# Patient Record
Sex: FEMALE | Race: BLACK OR AFRICAN AMERICAN | NOT HISPANIC OR LATINO | Employment: UNEMPLOYED | URBAN - METROPOLITAN AREA
[De-identification: names, ages, dates, MRNs, and addresses within clinical notes are randomized per-mention and may not be internally consistent; named-entity substitution may affect disease eponyms.]

---

## 2021-01-21 ENCOUNTER — HOSPITAL ENCOUNTER (EMERGENCY)
Facility: HOSPITAL | Age: 41
Discharge: HOME | End: 2021-01-21
Attending: EMERGENCY MEDICINE

## 2021-01-21 VITALS
TEMPERATURE: 99 F | RESPIRATION RATE: 20 BRPM | WEIGHT: 263.13 LBS | SYSTOLIC BLOOD PRESSURE: 186 MMHG | BODY MASS INDEX: 41.3 KG/M2 | HEART RATE: 81 BPM | DIASTOLIC BLOOD PRESSURE: 100 MMHG | HEIGHT: 67 IN | OXYGEN SATURATION: 100 %

## 2021-01-21 DIAGNOSIS — I10 ESSENTIAL HYPERTENSION: Primary | ICD-10-CM

## 2021-01-21 DIAGNOSIS — D64.9 ANEMIA, UNSPECIFIED TYPE: ICD-10-CM

## 2021-01-21 DIAGNOSIS — N92.0 MENORRHAGIA WITH REGULAR CYCLE: ICD-10-CM

## 2021-01-21 DIAGNOSIS — R55 NEAR SYNCOPE: ICD-10-CM

## 2021-01-21 LAB
ALBUMIN SERPL BCP-MCNC: 4 G/DL (ref 3.5–5.2)
ALP SERPL-CCNC: 57 U/L (ref 55–135)
ALT SERPL W/O P-5'-P-CCNC: 14 U/L (ref 10–44)
ANION GAP SERPL CALC-SCNC: 9 MMOL/L (ref 8–16)
AST SERPL-CCNC: 20 U/L (ref 10–40)
B-HCG UR QL: NEGATIVE
BASOPHILS # BLD AUTO: 0.04 K/UL (ref 0–0.2)
BASOPHILS NFR BLD: 0.5 % (ref 0–1.9)
BILIRUB SERPL-MCNC: 0.3 MG/DL (ref 0.1–1)
BUN SERPL-MCNC: 19 MG/DL (ref 6–20)
CALCIUM SERPL-MCNC: 8.4 MG/DL (ref 8.7–10.5)
CHLORIDE SERPL-SCNC: 102 MMOL/L (ref 95–110)
CO2 SERPL-SCNC: 27 MMOL/L (ref 23–29)
CREAT SERPL-MCNC: 1.1 MG/DL (ref 0.5–1.4)
DIFFERENTIAL METHOD: ABNORMAL
EOSINOPHIL # BLD AUTO: 0 K/UL (ref 0–0.5)
EOSINOPHIL NFR BLD: 0.4 % (ref 0–8)
ERYTHROCYTE [DISTWIDTH] IN BLOOD BY AUTOMATED COUNT: 19.5 % (ref 11.5–14.5)
EST. GFR  (AFRICAN AMERICAN): >60 ML/MIN/1.73 M^2
EST. GFR  (NON AFRICAN AMERICAN): >60 ML/MIN/1.73 M^2
GLUCOSE SERPL-MCNC: 107 MG/DL (ref 70–110)
HCT VFR BLD AUTO: 28.1 % (ref 37–48.5)
HCV AB SERPL QL IA: NEGATIVE
HGB BLD-MCNC: 7.3 G/DL (ref 12–16)
HIV 1+2 AB+HIV1 P24 AG SERPL QL IA: NEGATIVE
IMM GRANULOCYTES # BLD AUTO: 0.03 K/UL (ref 0–0.04)
IMM GRANULOCYTES NFR BLD AUTO: 0.4 % (ref 0–0.5)
LYMPHOCYTES # BLD AUTO: 1.5 K/UL (ref 1–4.8)
LYMPHOCYTES NFR BLD: 19.6 % (ref 18–48)
MCH RBC QN AUTO: 17.3 PG (ref 27–31)
MCHC RBC AUTO-ENTMCNC: 26 G/DL (ref 32–36)
MCV RBC AUTO: 67 FL (ref 82–98)
MONOCYTES # BLD AUTO: 0.5 K/UL (ref 0.3–1)
MONOCYTES NFR BLD: 6.2 % (ref 4–15)
NEUTROPHILS # BLD AUTO: 5.6 K/UL (ref 1.8–7.7)
NEUTROPHILS NFR BLD: 72.9 % (ref 38–73)
NRBC BLD-RTO: 0 /100 WBC
PLATELET # BLD AUTO: 313 K/UL (ref 150–350)
PMV BLD AUTO: 10.3 FL (ref 9.2–12.9)
POTASSIUM SERPL-SCNC: 4.2 MMOL/L (ref 3.5–5.1)
PROT SERPL-MCNC: 7.9 G/DL (ref 6–8.4)
RBC # BLD AUTO: 4.22 M/UL (ref 4–5.4)
SODIUM SERPL-SCNC: 138 MMOL/L (ref 136–145)
WBC # BLD AUTO: 7.74 K/UL (ref 3.9–12.7)

## 2021-01-21 PROCEDURE — 93010 ELECTROCARDIOGRAM REPORT: CPT | Mod: ,,, | Performed by: INTERNAL MEDICINE

## 2021-01-21 PROCEDURE — 86703 HIV-1/HIV-2 1 RESULT ANTBDY: CPT

## 2021-01-21 PROCEDURE — 93005 ELECTROCARDIOGRAM TRACING: CPT

## 2021-01-21 PROCEDURE — 86803 HEPATITIS C AB TEST: CPT

## 2021-01-21 PROCEDURE — 36415 COLL VENOUS BLD VENIPUNCTURE: CPT

## 2021-01-21 PROCEDURE — 81025 URINE PREGNANCY TEST: CPT

## 2021-01-21 PROCEDURE — 96360 HYDRATION IV INFUSION INIT: CPT

## 2021-01-21 PROCEDURE — 80053 COMPREHEN METABOLIC PANEL: CPT

## 2021-01-21 PROCEDURE — 93010 EKG 12-LEAD: ICD-10-PCS | Mod: ,,, | Performed by: INTERNAL MEDICINE

## 2021-01-21 PROCEDURE — 25000003 PHARM REV CODE 250: Performed by: EMERGENCY MEDICINE

## 2021-01-21 PROCEDURE — 99284 EMERGENCY DEPT VISIT MOD MDM: CPT | Mod: 25

## 2021-01-21 PROCEDURE — 85025 COMPLETE CBC W/AUTO DIFF WBC: CPT

## 2021-01-21 RX ORDER — CLONIDINE HYDROCHLORIDE 0.1 MG/1
0.1 TABLET ORAL
Status: DISCONTINUED | OUTPATIENT
Start: 2021-01-21 | End: 2021-01-21

## 2021-01-21 RX ORDER — HYDROCHLOROTHIAZIDE 25 MG/1
25 TABLET ORAL DAILY
Qty: 30 TABLET | Refills: 0 | Status: SHIPPED | OUTPATIENT
Start: 2021-01-21 | End: 2021-02-20

## 2021-01-21 RX ORDER — CLONIDINE HYDROCHLORIDE 0.2 MG/1
0.2 TABLET ORAL
Status: COMPLETED | OUTPATIENT
Start: 2021-01-21 | End: 2021-01-21

## 2021-01-21 RX ORDER — HYDROCHLOROTHIAZIDE 25 MG/1
25 TABLET ORAL ONCE
Status: COMPLETED | OUTPATIENT
Start: 2021-01-21 | End: 2021-01-21

## 2021-01-21 RX ORDER — HYDROCHLOROTHIAZIDE 25 MG/1
25 TABLET ORAL DAILY
Status: DISCONTINUED | OUTPATIENT
Start: 2021-01-22 | End: 2021-01-21

## 2021-01-21 RX ADMIN — SODIUM CHLORIDE 500 ML: 0.9 INJECTION, SOLUTION INTRAVENOUS at 07:01

## 2021-01-21 RX ADMIN — HYDROCHLOROTHIAZIDE 25 MG: 25 TABLET ORAL at 07:01

## 2021-01-21 RX ADMIN — CLONIDINE HYDROCHLORIDE 0.2 MG: 0.2 TABLET ORAL at 07:01

## 2025-04-05 ENCOUNTER — HOSPITAL ENCOUNTER (EMERGENCY)
Facility: HOSPITAL | Age: 45
Discharge: HOME | End: 2025-04-05
Attending: FAMILY MEDICINE

## 2025-04-05 VITALS
DIASTOLIC BLOOD PRESSURE: 95 MMHG | SYSTOLIC BLOOD PRESSURE: 185 MMHG | HEIGHT: 67 IN | BODY MASS INDEX: 41.21 KG/M2 | OXYGEN SATURATION: 98 % | TEMPERATURE: 99 F | RESPIRATION RATE: 20 BRPM | HEART RATE: 88 BPM

## 2025-04-05 DIAGNOSIS — R55 NEAR SYNCOPE: ICD-10-CM

## 2025-04-05 DIAGNOSIS — Z51.89 ENCOUNTER FOR BLOOD TRANSFUSION: ICD-10-CM

## 2025-04-05 DIAGNOSIS — N92.4 EXCESSIVE BLEEDING IN PREMENOPAUSAL PERIOD: ICD-10-CM

## 2025-04-05 DIAGNOSIS — D64.9 ANEMIA, UNSPECIFIED TYPE: ICD-10-CM

## 2025-04-05 DIAGNOSIS — T80.92XA BLOOD TRANSFUSION REACTION, INITIAL ENCOUNTER: Primary | ICD-10-CM

## 2025-04-05 LAB
ABSOLUTE EOSINOPHIL (OHS): 0.03 K/UL
ABSOLUTE MONOCYTE (OHS): 0.51 K/UL (ref 0.3–1)
ABSOLUTE NEUTROPHIL COUNT (OHS): 6.08 K/UL (ref 1.8–7.7)
ALBUMIN SERPL BCP-MCNC: 3.4 G/DL (ref 3.5–5.2)
ALP SERPL-CCNC: 65 UNIT/L (ref 40–150)
ALT SERPL W/O P-5'-P-CCNC: 6 UNIT/L (ref 10–44)
ANION GAP (OHS): 10 MMOL/L (ref 8–16)
AST SERPL-CCNC: 13 UNIT/L (ref 11–45)
B-HCG UR QL: NEGATIVE
BACTERIA #/AREA URNS AUTO: NORMAL /HPF
BASOPHILS # BLD AUTO: 0.02 K/UL
BASOPHILS NFR BLD AUTO: 0.3 %
BILIRUB SERPL-MCNC: 0.3 MG/DL (ref 0.1–1)
BILIRUB UR QL STRIP.AUTO: NEGATIVE
BNP SERPL-MCNC: 69 PG/ML (ref 0–99)
BUN SERPL-MCNC: 14 MG/DL (ref 6–20)
CALCIUM SERPL-MCNC: 9 MG/DL (ref 8.7–10.5)
CHLORIDE SERPL-SCNC: 102 MMOL/L (ref 95–110)
CLARITY UR: ABNORMAL
CO2 SERPL-SCNC: 24 MMOL/L (ref 23–29)
COLOR UR AUTO: YELLOW
CREAT SERPL-MCNC: 1.3 MG/DL (ref 0.5–1.4)
ERYTHROCYTE [DISTWIDTH] IN BLOOD BY AUTOMATED COUNT: 19.1 % (ref 11.5–14.5)
GFR SERPLBLD CREATININE-BSD FMLA CKD-EPI: 52 ML/MIN/1.73/M2
GLUCOSE SERPL-MCNC: 101 MG/DL (ref 70–110)
GLUCOSE UR QL STRIP: NEGATIVE
HCT VFR BLD AUTO: 20.6 % (ref 37–48.5)
HCV AB SERPL QL IA: NEGATIVE
HGB BLD-MCNC: 5.3 GM/DL (ref 12–16)
HGB UR QL STRIP: ABNORMAL
HIV 1+2 AB+HIV1 P24 AG SERPL QL IA: NEGATIVE
IMM GRANULOCYTES # BLD AUTO: 0.04 K/UL (ref 0–0.04)
IMM GRANULOCYTES NFR BLD AUTO: 0.5 % (ref 0–0.5)
INDIRECT COOMBS: NORMAL
KETONES UR QL STRIP: ABNORMAL
LEUKOCYTE ESTERASE UR QL STRIP: NEGATIVE
LYMPHOCYTES # BLD AUTO: 1.27 K/UL (ref 1–4.8)
MCH RBC QN AUTO: 16.3 PG (ref 27–31)
MCHC RBC AUTO-ENTMCNC: 25.7 G/DL (ref 32–36)
MCV RBC AUTO: 63 FL (ref 82–98)
MICROSCOPIC COMMENT: NORMAL
NITRITE UR QL STRIP: NEGATIVE
NUCLEATED RBC (/100WBC) (OHS): 0 /100 WBC
PH UR STRIP: 6 [PH]
PLATELET # BLD AUTO: 540 K/UL (ref 150–450)
PMV BLD AUTO: 9.5 FL (ref 9.2–12.9)
POTASSIUM SERPL-SCNC: 4 MMOL/L (ref 3.5–5.1)
PROT SERPL-MCNC: 8.7 GM/DL (ref 6–8.4)
PROT UR QL STRIP: ABNORMAL
RBC # BLD AUTO: 3.25 M/UL (ref 4–5.4)
RBC #/AREA URNS AUTO: 2 /HPF (ref 0–4)
RELATIVE EOSINOPHIL (OHS): 0.4 %
RELATIVE LYMPHOCYTE (OHS): 16 % (ref 18–48)
RELATIVE MONOCYTE (OHS): 6.4 % (ref 4–15)
RELATIVE NEUTROPHIL (OHS): 76.4 % (ref 38–73)
RH BLD: NORMAL
SODIUM SERPL-SCNC: 136 MMOL/L (ref 136–145)
SP GR UR STRIP: 1.01
SPECIMEN OUTDATE: NORMAL
SQUAMOUS #/AREA URNS AUTO: 7 /HPF
TROPONIN I SERPL DL<=0.01 NG/ML-MCNC: 0.01 NG/ML
UROBILINOGEN UR STRIP-ACNC: NEGATIVE EU/DL
WBC # BLD AUTO: 7.95 K/UL (ref 3.9–12.7)
WBC #/AREA URNS AUTO: 3 /HPF (ref 0–5)

## 2025-04-05 PROCEDURE — 63600175 PHARM REV CODE 636 W HCPCS: Mod: JZ,TB | Performed by: FAMILY MEDICINE

## 2025-04-05 PROCEDURE — 99285 EMERGENCY DEPT VISIT HI MDM: CPT | Mod: 25

## 2025-04-05 PROCEDURE — 96374 THER/PROPH/DIAG INJ IV PUSH: CPT

## 2025-04-05 PROCEDURE — 86850 RBC ANTIBODY SCREEN: CPT | Performed by: FAMILY MEDICINE

## 2025-04-05 PROCEDURE — 85025 COMPLETE CBC W/AUTO DIFF WBC: CPT | Performed by: FAMILY MEDICINE

## 2025-04-05 PROCEDURE — 87389 HIV-1 AG W/HIV-1&-2 AB AG IA: CPT | Performed by: FAMILY MEDICINE

## 2025-04-05 PROCEDURE — 84484 ASSAY OF TROPONIN QUANT: CPT | Performed by: FAMILY MEDICINE

## 2025-04-05 PROCEDURE — 36430 TRANSFUSION BLD/BLD COMPNT: CPT

## 2025-04-05 PROCEDURE — 81025 URINE PREGNANCY TEST: CPT | Performed by: FAMILY MEDICINE

## 2025-04-05 PROCEDURE — 80053 COMPREHEN METABOLIC PANEL: CPT | Performed by: FAMILY MEDICINE

## 2025-04-05 PROCEDURE — 83880 ASSAY OF NATRIURETIC PEPTIDE: CPT | Performed by: FAMILY MEDICINE

## 2025-04-05 PROCEDURE — P9016 RBC LEUKOCYTES REDUCED: HCPCS | Performed by: FAMILY MEDICINE

## 2025-04-05 PROCEDURE — 86803 HEPATITIS C AB TEST: CPT | Performed by: FAMILY MEDICINE

## 2025-04-05 PROCEDURE — 25000003 PHARM REV CODE 250: Performed by: FAMILY MEDICINE

## 2025-04-05 PROCEDURE — 86920 COMPATIBILITY TEST SPIN: CPT | Performed by: FAMILY MEDICINE

## 2025-04-05 PROCEDURE — 81001 URINALYSIS AUTO W/SCOPE: CPT | Performed by: FAMILY MEDICINE

## 2025-04-05 RX ORDER — HYDROCODONE BITARTRATE AND ACETAMINOPHEN 500; 5 MG/1; MG/1
TABLET ORAL
Status: DISCONTINUED | OUTPATIENT
Start: 2025-04-05 | End: 2025-04-05 | Stop reason: HOSPADM

## 2025-04-05 RX ORDER — METHYLPREDNISOLONE SOD SUCC 125 MG
125 VIAL (EA) INJECTION
Status: COMPLETED | OUTPATIENT
Start: 2025-04-05 | End: 2025-04-05

## 2025-04-05 RX ORDER — CLONIDINE HYDROCHLORIDE 0.2 MG/1
0.2 TABLET ORAL
Status: COMPLETED | OUTPATIENT
Start: 2025-04-05 | End: 2025-04-05

## 2025-04-05 RX ADMIN — CLONIDINE HYDROCHLORIDE 0.2 MG: 0.2 TABLET ORAL at 05:04

## 2025-04-05 RX ADMIN — METHYLPREDNISOLONE SODIUM SUCCINATE 125 MG: 125 INJECTION, POWDER, FOR SOLUTION INTRAMUSCULAR; INTRAVENOUS at 11:04

## 2025-04-05 NOTE — ED NOTES
Provider notified that the pt's BP and Temp have elevated and that the pt does not have any s/s at this time. Decision made to consider a reaction. Reaction protocol started blood bank notified.

## 2025-04-05 NOTE — ED PROVIDER NOTES
SCRIBE #1 NOTE: I, Carmen Chavez, am scribing for, and in the presence of, Cecille Crawford MD. I have scribed the entire note.       History     Chief Complaint   Patient presents with    near syncopal episode     Pt was at softball game and bent down to  water bottle then felt like she was going to pass out. Pt also reports sinus infection since last week. Pt has no complaints on arrival and is gcs 15     Review of patient's allergies indicates:  No Known Allergies      History of Present Illness     HPI    4/5/2025, 10:11 AM  History obtained from the patient and medical records      History of Present Illness: Marcelina Colon is a 45 y.o. female patient with a PMHx of thyroid adenoma and HTN who presents to the Emergency Department for evaluation of near syncope which onset PTA. Pt states that while at her daughter's track meet this morning, she dropped her water bottle. When pt leaned over to  her water bottle, she became dizzy and felt like she was about to pass out. Pt also reports having sinus issues for the past week including HA, rhinorrhea, congestion, and eye watering. Pt reports a thick yellow discharge from her nose. Pt states that she is feeling better while in the ED. No mitigating or exacerbating factors reported. Associated sxs include dizziness, HA, rhinorrhea, congestion, and eye watering. Patient denies any sinus pressure, CP, or SOB. Prior Tx includes OTC sinus medication last week.  No further complaints or concerns at this time.       Arrival mode: Ambulance Service    PCP: No, Primary Doctor        Past Medical History:  Past Medical History:   Diagnosis Date    Thyroid adenoma        Past Surgical History:  Past Surgical History:   Procedure Laterality Date    THYROID SURGERY           Family History:  No family history on file.    Social History:  Social History     Tobacco Use    Smoking status: Every Day     Types: Cigarettes    Smokeless tobacco: Never   Substance  and Sexual Activity    Alcohol use: Yes    Drug use: Never    Sexual activity: Yes        Review of Systems     Review of Systems   Constitutional:  Negative for fever.   HENT:  Positive for congestion and rhinorrhea. Negative for sinus pressure and sore throat.    Eyes:  Positive for discharge.   Respiratory:  Negative for shortness of breath.    Cardiovascular:  Negative for chest pain.   Gastrointestinal:  Negative for nausea.   Genitourinary:  Negative for dysuria.   Musculoskeletal:  Negative for back pain.   Skin:  Negative for rash.   Neurological:  Positive for dizziness and headaches. Negative for weakness.        (+) near syncope   Hematological:  Does not bruise/bleed easily.      Physical Exam     Initial Vitals [04/05/25 0955]   BP Pulse Resp Temp SpO2   132/84 (!) 113 18 98.7 °F (37.1 °C) 99 %      MAP       --          Physical Exam  Nursing Notes and Vital Signs Reviewed.  Constitutional: Patient is in no acute distress. Well-developed and well-nourished.  Head: Atraumatic. Normocephalic.  Eyes: PERRL. EOM intact. Conjunctivae are not pale. No scleral icterus.  ENT: Mucous membranes are moist. Oropharynx is clear and symmetric. There is sinus drainage.  Neck: Supple. Full ROM. No lymphadenopathy.  Cardiovascular: Regular rate. Regular rhythm. No murmurs, rubs, or gallops. Distal pulses are 2+ and symmetric.  Pulmonary/Chest: No respiratory distress. Clear to auscultation bilaterally. No wheezing or rales.  Abdominal: Soft and non-distended.  There is no tenderness.  No rebound, guarding, or rigidity. Good bowel sounds.  Genitourinary: No CVA tenderness.  Musculoskeletal: Moves all extremities. No obvious deformities. No edema. No calf tenderness.  Skin: Warm and dry.  Neurological:  Alert, awake, and appropriate.  Normal speech.  No acute focal neurological deficits are appreciated.  Psychiatric: Normal affect. Good eye contact. Appropriate in content.     ED Course   Critical Care    Date/Time:  4/5/2025 2:49 PM    Performed by: Cecille Crawford MD  Authorized by: Cecille Crafword MD  Direct patient critical care time: 15 minutes  Additional history critical care time: 12 minutes  Ordering / reviewing critical care time: 10 minutes  Documentation critical care time: 8 minutes  Total critical care time (exclusive of procedural time) : 45 minutes  Critical care time was exclusive of separately billable procedures and treating other patients and teaching time.  Critical care was necessary to treat or prevent imminent or life-threatening deterioration of the following conditions: symptomatic anemia requiring blood transfusion.  Critical care was time spent personally by me on the following activities: blood draw for specimens, development of treatment plan with patient or surrogate, discussions with consultants, interpretation of cardiac output measurements, evaluation of patient's response to treatment, examination of patient, obtaining history from patient or surrogate, ordering and performing treatments and interventions, ordering and review of laboratory studies, ordering and review of radiographic studies, pulse oximetry, re-evaluation of patient's condition and review of old charts.        ED Vital Signs:  Vitals:    04/05/25 1430 04/05/25 1445 04/05/25 1500 04/05/25 1530   BP: (!) 180/101 (!) 171/93 (!) 176/96 (!) 183/90   Pulse: 100 96 95 94   Resp: 18 16 18 (!) 21   Temp:   98.8 °F (37.1 °C)    TempSrc:   Oral    SpO2: 100% 99% 100% 100%   Height:        04/05/25 1600 04/05/25 1630 04/05/25 1721 04/05/25 1730   BP: (!) 181/100 (!) 181/97 (!) 197/105 (!) 186/101   Pulse: 95 98 97 97   Resp: 20 20 19 20   Temp: 99.5 °F (37.5 °C)      TempSrc: Oral      SpO2: 99% 100% 97% 100%   Height:        04/05/25 1800 04/05/25 1820 04/05/25 1830 04/05/25 1900   BP: (!) 169/93  (!) 168/94 (!) 170/93   Pulse: 95  90 91   Resp: 18   (!) 36   Temp:  99.3 °F (37.4 °C)     TempSrc:  Oral     SpO2: 99%  100% 100%    Height:        04/05/25 1930 04/05/25 1945 04/05/25 2000   BP: (!) 186/99  (!) 185/95   Pulse: 86 86 88   Resp: 18 17 20   Temp:  99 °F (37.2 °C)    TempSrc:  Oral    SpO2: 100% 99% 98%   Height:          Abnormal Lab Results:  Labs Reviewed   COMPREHENSIVE METABOLIC PANEL - Abnormal       Result Value    Sodium 136      Potassium 4.0      Chloride 102      CO2 24      Glucose 101      BUN 14      Creatinine 1.3      Calcium 9.0      Protein Total 8.7 (*)     Albumin 3.4 (*)     Bilirubin Total 0.3      ALP 65      AST 13      ALT 6 (*)     Anion Gap 10      eGFR 52 (*)    URINALYSIS - Abnormal    Color, UA Yellow      Appearance, UA Hazy (*)     pH, UA 6.0      Spec Grav UA 1.015      Protein, UA 1+ (*)     Glucose, UA Negative      Ketones, UA Trace (*)     Bilirubin, UA Negative      Blood, UA 1+ (*)     Nitrites, UA Negative      Urobilinogen, UA Negative      Leukocyte Esterase, UA Negative     CBC WITH DIFFERENTIAL - Abnormal    WBC 7.95      RBC 3.25 (*)     HGB 5.3 (*)     HCT 20.6 (*)     MCV 63 (*)     MCH 16.3 (*)     MCHC 25.7 (*)     RDW 19.1 (*)     Platelet Count 540 (*)     MPV 9.5      Nucleated RBC 0      Neut % 76.4 (*)     Lymph % 16.0 (*)     Mono % 6.4      Eos % 0.4      Basophil % 0.3      Imm Grans % 0.5      Neut # 6.08      Lymph # 1.27      Mono # 0.51      Eos # 0.03      Baso # 0.02      Imm Grans # 0.04     PREGNANCY TEST, URINE RAPID - Normal    hCG Qualitative, Urine Negative     B-TYPE NATRIURETIC PEPTIDE - Normal    BNP 69     TROPONIN I - Normal    Troponin-I 0.013     HEPATITIS C ANTIBODY - Normal    Hep C Ab Interp Negative     HIV 1 / 2 ANTIBODY - Normal    HIV 1/2 Ag/Ab Negative     CBC W/ AUTO DIFFERENTIAL    Narrative:     The following orders were created for panel order CBC auto differential.  Procedure                               Abnormality         Status                     ---------                               -----------         ------                     CBC  with Differential[[id number]]        Abnormal            Final result                 Please view results for these tests on the individual orders.   URINALYSIS MICROSCOPIC    RBC, UA 2      WBC, UA 3      Bacteria, UA Rare      Squamous Epithelial Cells, UA 7      Microscopic Comment       HEP C VIRUS HOLD SPECIMEN   TYPE & SCREEN    Specimen Outdate 04/08/2025 23:59      Group & Rh A POS      Indirect [place] NEG     TRANSFUSION REACTION WORKUP    Path Interpretation NEG     PATHOLOGIST INTERPRETATION TRANSF REACTION    Pathologist Interpretation Transf. Reaction No definitive evidence of a transfusion reaction.     PREPARE RBC SOFT    UNIT NUMBER [id number]      UNIT ABO/RH A POS      DISPENSE STATUS Released      Unit Expiration [id number]      Product Code N4139T21      Unit Blood Type Code 6200      CROSSMATCH INTERPRETATION Compatible      UNIT NUMBER [id number]      UNIT ABO/RH A POS      DISPENSE STATUS Transfused      Unit Expiration [id number]      Product Code G3013B73      Unit Blood Type Code 6200      CROSSMATCH INTERPRETATION Compatible          All Lab Results:  Results for orders placed or performed during the hospital encounter of 04/05/25   Comprehensive metabolic panel    Collection Time: 04/05/25 11:00 AM   Result Value Ref Range    Sodium 136 136 - 145 mmol/L    Potassium 4.0 3.5 - 5.1 mmol/L    Chloride 102 95 - 110 mmol/L    CO2 24 23 - 29 mmol/L    Glucose 101 70 - 110 mg/dL    BUN 14 6 - 20 mg/dL    Creatinine 1.3 0.5 - 1.4 mg/dL    Calcium 9.0 8.7 - 10.5 mg/dL    Protein Total 8.7 (H) 6.0 - 8.4 gm/dL    Albumin 3.4 (L) 3.5 - 5.2 g/dL    Bilirubin Total 0.3 0.1 - 1.0 mg/dL    ALP 65 40 - 150 unit/L    AST 13 11 - 45 unit/L    ALT 6 (L) 10 - 44 unit/L    Anion Gap 10 8 - 16 mmol/L    eGFR 52 (L) >60 mL/min/1.73/m2   B-Type natriuretic peptide (BNP)    Collection Time: 04/05/25 11:00 AM   Result Value Ref Range    BNP 69 0 - 99 pg/mL   Troponin I    Collection Time: 04/05/25 11:00 AM    Result Value Ref Range    Troponin-I 0.013 <=0.026 ng/mL   CBC with Differential    Collection Time: 04/05/25 11:00 AM   Result Value Ref Range    WBC 7.95 3.90 - 12.70 K/uL    RBC 3.25 (L) 4.00 - 5.40 M/uL    HGB 5.3 (LL) 12.0 - 16.0 gm/dL    HCT 20.6 (L) 37.0 - 48.5 %    MCV 63 (L) 82 - 98 fL    MCH 16.3 (L) 27.0 - 31.0 pg    MCHC 25.7 (L) 32.0 - 36.0 g/dL    RDW 19.1 (H) 11.5 - 14.5 %    Platelet Count 540 (H) 150 - 450 K/uL    MPV 9.5 9.2 - 12.9 fL    Nucleated RBC 0 <=0 /100 WBC    Neut % 76.4 (H) 38 - 73 %    Lymph % 16.0 (L) 18 - 48 %    Mono % 6.4 4 - 15 %    Eos % 0.4 <=8 %    Basophil % 0.3 <=1.9 %    Imm Grans % 0.5 0.0 - 0.5 %    Neut # 6.08 1.8 - 7.7 K/uL    Lymph # 1.27 1 - 4.8 K/uL    Mono # 0.51 0.3 - 1 K/uL    Eos # 0.03 <=0.5 K/uL    Baso # 0.02 <=0.2 K/uL    Imm Grans # 0.04 0.00 - 0.04 K/uL   Hepatitis C Antibody    Collection Time: 04/05/25 11:00 AM   Result Value Ref Range    Hep C Ab Interp Negative Negative   HIV 1/2 Ag/Ab (4th Gen)    Collection Time: 04/05/25 11:00 AM   Result Value Ref Range    HIV 1/2 Ag/Ab Negative Negative   Urinalysis - Clean Catch    Collection Time: 04/05/25 11:03 AM   Result Value Ref Range    Color, UA Yellow Straw, Paulette, Yellow, Light-Orange    Appearance, UA Hazy (A) Clear    pH, UA 6.0 5.0 - 8.0    Spec Grav UA 1.015 1.005 - 1.030    Protein, UA 1+ (A) Negative    Glucose, UA Negative Negative    Ketones, UA Trace (A) Negative    Bilirubin, UA Negative Negative    Blood, UA 1+ (A) Negative    Nitrites, UA Negative Negative    Urobilinogen, UA Negative <2.0 EU/dL    Leukocyte Esterase, UA Negative Negative   Pregnancy, urine rapid    Collection Time: 04/05/25 11:03 AM   Result Value Ref Range    hCG Qualitative, Urine Negative Negative   Urinalysis Microscopic    Collection Time: 04/05/25 11:03 AM   Result Value Ref Range    RBC, UA 2 0 - 4 /HPF    WBC, UA 3 0 - 5 /HPF    Bacteria, UA Rare None, Rare, Occasional /HPF    Squamous Epithelial Cells, UA 7 /HPF     Microscopic Comment     Type & Screen    Collection Time: 04/05/25 12:34 PM   Result Value Ref Range    Specimen Outdate 04/08/2025 23:59     Group & Rh A POS     Indirect Alyssa NEG    Prepare RBC 2 Units; transfusion    Collection Time: 04/05/25 12:34 PM   Result Value Ref Range    UNIT NUMBER [id number]     UNIT ABO/RH A POS     DISPENSE STATUS Released     Unit Expiration [id number]     Product Code O0901S76     Unit Blood Type Code 6200     CROSSMATCH INTERPRETATION Compatible     UNIT NUMBER [id number]     UNIT ABO/RH A POS     DISPENSE STATUS Transfused     Unit Expiration [id number]     Product Code F6829D82     Unit Blood Type Code 6200     CROSSMATCH INTERPRETATION Compatible    Transfusion Reaction Workup    Collection Time: 04/05/25  5:06 PM   Result Value Ref Range    Path Interpretation NEG    Pathologist Interp Transfusion Reaction    Collection Time: 04/05/25  5:06 PM   Result Value Ref Range    Pathologist Interpretation Transf. Reaction No definitive evidence of a transfusion reaction.        Imaging Results:  Imaging Results              X-Ray Chest AP Portable (Final result)  Result time 04/05/25 11:50:42      Final result by Pravin BalderasFrancisco), MD (04/05/25 11:50:42)                   Impression:     Clear lungs.    Finalized on: 4/5/2025 11:50 AM By:  Torsten Balderas MD  [facility]# [id number]      2025-04-05 11:52:46.626     [facility]               Narrative:    EXAM: XR CHEST AP PORTABLE    CLINICAL HISTORY: Chest pain    FINDINGS:  1 view.  Normal size heart.  Clear lungs.                                         The EKG was ordered, reviewed, and independently interpreted by the ED provider.  Interpretation time: 10:21  Rate: 105 BPM  Rhythm: sinus tachycardia  Interpretation: Moderate voltage criteria for LVH, ma be normal variant (R in aVL, [place] product). ST and T wave abnormality, consider lateral ischemia. No STEMI.           The Emergency Provider reviewed the vital signs and test  results, which are outlined above.     ED Discussion     8:14 PM: Reassessed pt at this time. Discussed with patient and/or family/caretaker all pertinent ED information and results. Discussed pt dx and plan of tx. Gave the patient all f/u and return to the ED instructions. All questions and concerns were addressed at this time. Patient and/or family/caretaker expresses understanding of information and instructions, and is comfortable with plan to discharge. Pt is stable for discharge.     I discussed with patient and/or family/caretaker that evaluation in the ED does not suggest any emergent or life threatening medical conditions requiring immediate intervention beyond what was provided in the ED, and I believe patient is safe for discharge.  Regardless, an unremarkable evaluation in the ED does not preclude the development or presence of a serious of life threatening condition. As such, I instructed that the patient is to return immediately for any worsening or change in current symptoms.           Medical Decision Making  46 yo female was at walking track when she bent over to  a water bottle and almost passed out, became very dizzy and had to sit down.  She denies any chest pain or SOB.  She has been heavy vaginal bleeding which is normal for her cycle.  H&H 5/20. Denies any GI bleeding or melena.  Will transfuse 2 u PRBCs and refer to gynecology for menorrhagia.    Amount and/or Complexity of Data Reviewed  External Data Reviewed: labs, radiology, ECG and notes.  Labs: ordered. Decision-making details documented in ED Course.  Radiology: ordered. Decision-making details documented in ED Course.  ECG/medicine tests: ordered and independent interpretation performed. Decision-making details documented in ED Course.    Risk  Prescription drug management.    Critical Care  Total time providing critical care: 45 minutes                ED Medication(s):  Medications   methylPREDNISolone sodium succinate  injection 125 mg (125 mg Intravenous Given 4/5/25 1126)   cloNIDine tablet 0.2 mg (0.2 mg Oral Given 4/5/25 1721)       Discharge Medication List as of 4/5/2025  8:09 PM                  Scribe Attestation:   Scribe #1: I performed the above scribed service and the documentation accurately describes the services I performed. I attest to the accuracy of the note.     Attending:   Physician Attestation Statement for Scribe #1: I, Cecille Crawford MD, personally performed the services described in this documentation, as scribed by Carmen Chavez, in my presence, and it is both accurate and complete.           Clinical Impression       ICD-10-CM ICD-9-CM   1. Blood transfusion reaction, initial encounter  T80.92XA 999.80     E879.8   2. Near syncope  R55 780.2   3. Excessive bleeding in premenopausal period  N92.4 627.0   4. Anemia, unspecified type  D64.9 285.9   5. Encounter for blood transfusion  Z51.89 V58.2       Disposition:   Disposition: Discharged  Condition: Stable        Cecille Crawford MD  04/10/25 1017

## 2025-04-06 LAB
OHS QRS DURATION: 86 MS
OHS QTC CALCULATION: 454 MS

## 2025-04-07 LAB
PATH INTP: NORMAL
PATHOLOGIST INTERPRETATION TRANSF REACTION: NORMAL

## 2025-04-09 LAB
ABO + RH BLD: NORMAL
ABO + RH BLD: NORMAL
BLD PROD TYP BPU: NORMAL
BLD PROD TYP BPU: NORMAL
BLOOD UNIT EXPIRATION DATE: NORMAL
BLOOD UNIT EXPIRATION DATE: NORMAL
BLOOD UNIT TYPE CODE: 6200
BLOOD UNIT TYPE CODE: 6200
CROSSMATCH INTERPRETATION: NORMAL
CROSSMATCH INTERPRETATION: NORMAL
DISPENSE STATUS: NORMAL
DISPENSE STATUS: NORMAL
UNIT NUMBER: NORMAL
UNIT NUMBER: NORMAL